# Patient Record
Sex: FEMALE | Race: WHITE
[De-identification: names, ages, dates, MRNs, and addresses within clinical notes are randomized per-mention and may not be internally consistent; named-entity substitution may affect disease eponyms.]

---

## 2018-01-22 ENCOUNTER — HOSPITAL ENCOUNTER (EMERGENCY)
Dept: HOSPITAL 80 - FED | Age: 18
Discharge: HOME | End: 2018-01-22
Payer: COMMERCIAL

## 2018-01-22 VITALS
OXYGEN SATURATION: 97 % | TEMPERATURE: 98.4 F | SYSTOLIC BLOOD PRESSURE: 128 MMHG | DIASTOLIC BLOOD PRESSURE: 86 MMHG | HEART RATE: 66 BPM

## 2018-01-22 VITALS — RESPIRATION RATE: 16 BRPM

## 2018-01-22 DIAGNOSIS — K52.9: ICD-10-CM
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
## 2018-01-22 DIAGNOSIS — S09.90XA: Primary | ICD-10-CM

## 2018-01-22 NOTE — EDPHY
H & P


Stated Complaint: 2 days s/p fall


Time Seen by Provider: 01/22/18 19:47





- Personal History


LMP (Females 10-55): 8-14 Days Ago


Current Tetanus/Diphtheria Vaccine: Yes


Current Tetanus Diphtheria and Acellular Pertussis (TDAP): Yes





- Medical/Surgical History


Hx Asthma: No


Hx Chronic Respiratory Disease: No


Hx Diabetes: No


Hx Cardiac Disease: No


Hx Renal Disease: No


Hx Cirrhosis: No


Hx Alcoholism: No


Hx HIV/AIDS: No


Hx Splenectomy or Spleen Trauma: No





- Social History


Smoking Status: Never smoked


Constitutional: 


 Initial Vital Signs











Temperature (C)  36.6 C   01/22/18 19:10


 


Heart Rate  83   01/22/18 19:10


 


Respiratory Rate  16   01/22/18 19:10


 


Blood Pressure  136/93 H  01/22/18 19:10


 


O2 Sat (%)  99   01/22/18 19:10








 











O2 Delivery Mode               Room Air














Allergies/Adverse Reactions: 


 





No Known Allergies Allergy (Unverified 01/22/18 19:12)


 








Home Medications: 














 Medication  Instructions  Recorded


 


Ondansetron Odt [Zofran Odt 4 mg 4 mg PO Q4 PRN #10 tab 01/22/18





(RX)]  














Medical Decision Making


ED Course/Re-evaluation: 





CHIEF COMPLAINT:  Vomiting, head injury





HISTORY OF PRESENT ILLNESS:  The patient is a 18 y/o female arriving with her 

friend for evaluation of a head injury and vomiting. Two nights ago she was 

drinking alcohol and started vomiting. She got home and stepped on her desk to 

get into the upper bunk and slipped and fell. She assumes she lost 

consciousness because the next thing she remembers is waking up lying on left 

side of her face with a bloody nose. She had some neck pain and left-sided 

headache at that time, but was able to sleep normally. Throughout the day 

yesterday she felt normal without a headache, neck pain, or vomiting. Today 

around 12:30 she developed nausea and started vomiting again. She thinks she 

has the stomach flu and went to urgent care for evaluation, but they referred 

her here for evaluation. No confusion, vision changes, weakness, paresthesias. 

She denies any other complaints and is normally healthy. 





REVIEW OF SYSTEMS:  





A 10 point review of systems was performed and is negative with the exception 

of the elements mentioned in the history of present illness.





PHYSICAL EXAM:  





HR, BP, O2 Sat, RR.  Temp noted


General Appearance:  Alert, well hydrated, appropriate, and non-toxic appearing.


Head:  Atraumatic without scalp tenderness or obvious injury


Eyes:  Pupils equal, round, reactive to light and accommodation, EOMI, no trauma

, no injection.


Ears:  Clear bilaterally, no perforation, normal landmarks


Nose:  Atraumatic, no rhinorrhea, clear.


Throat:  There is no erythema or exudates, no lesions, normal tonsils, mucus 

membranes moist.


Neck:  Supple, nontender, no lymphadenopathy.


Respiratory:  No retractions, no distress, no wheezes, and no accessory muscle 

use.  Lungs are clear to auscultation bilaterally.


Cardiovascular:  Regular rate and rhythm, no murmurs, rubs, or gallops. Good 

capillary refill all extremities.


Gastrointestinal:  Abdomen is soft, nontender, non-distended, no masses, no 

rebound, no guarding, no peritoneal signs.


Musculoskeletal:  Normal active ROM of all extremities, atraumatic.


Neurological:  Alert, appropriate, and interactive.  The patient has non-focal 

cranial nerves, motor, sensory, and cerebellar exam.


Skin:  No rashes, good turgor, no nodules on palpation.





Past medical history: Migraines


Past surgical history: Denies


Family history: Noncontributory


Social history: Friend at bedside. [employer] Student.





DIFFERENTIAL DIAGNOSIS:   The differential diagnosis for the patient's nausea 

and vomiting included but was not limited to gastroenteritis, gastritis, 

appendicitis, head injury, and medication side effect.





MEDICAL DECISION MAKING:  





This is a normally healthy 18 y/o female who presents for evaluation of 

vomiting 2 days post head injury with LOC. She has otherwise felt normal since 

the head strike and presumed loss of consciousness. She has a completely normal 

neuro exam, no visible head trauma, and no current symptoms. Given that she 

felt normal yesterday, I have a very low suspicion for intracranial process and 

do not find a strong indication for imaging. Her episode of vomiting today more 

likely represent a gastroenteritis unrelated to her event 2 nights ago. Her 

abdomen is benign. Discussed treatment options and risks and benefits of 

imaging with the patient and she has opted to avoid imaging and be discharged 

home with Zofran. Return precautions and follow up instructions discussed. She 

is happy with this plan. 





- Data Points


Medications Given: 


 








Discontinued Medications





Ondansetron HCl (Zofran Odt 4 Mg Prepack#2)  1 btl TAKEHOME EDNOW ONE


   Stop: 01/22/18 19:55


   Last Admin: 01/22/18 20:07 Dose:  1 btl








Departure





- Departure


Disposition: Home, Routine, Self-Care


Clinical Impression: 


 Gastroenteritis





Head injury


Qualifiers:


 Encounter type: initial encounter Qualified Code(s): S09.90XA - Unspecified 

injury of head, initial encounter





Condition: Good


Instructions:  Head Injury (ED), Gastroenteritis (ED)


Additional Instructions: 


1. Use Zofran as prescribed when needed for nausea and vomiting. 


2. Follow up with your primary care provider for unimproved symptoms over the 

next few days.


3. Increase fluid intake.


4. Return to the ED for severe headache, weakness, numbness, fainting, confusion

, or other worsening of condition.


Referrals: 


AJCK SCHNEIDER,. [Primary Care Provider] - As per Instructions


Prescriptions: 


Ondansetron Odt [Zofran Odt 4 mg (RX)] 4 mg PO Q4 PRN #10 tab


 PRN Reason: Nausea/Vomiting, Use 1st


Report Scribed for: Riaz Gr


Report Scribed by: Claudette Johnston


Date of Report: 01/22/18


Time of Report: 19:54